# Patient Record
Sex: FEMALE | Race: WHITE | Employment: OTHER
[De-identification: names, ages, dates, MRNs, and addresses within clinical notes are randomized per-mention and may not be internally consistent; named-entity substitution may affect disease eponyms.]

---

## 2024-09-24 ENCOUNTER — TELEPHONE (OUTPATIENT)
Facility: HOSPITAL | Age: 79
End: 2024-09-24

## 2024-09-25 ENCOUNTER — HOSPITAL ENCOUNTER (OUTPATIENT)
Facility: HOSPITAL | Age: 79
Setting detail: RECURRING SERIES
Discharge: HOME | End: 2024-09-28
Payer: MEDICARE

## 2024-09-25 PROCEDURE — 97161 PT EVAL LOW COMPLEX 20 MIN: CPT

## 2024-09-25 PROCEDURE — 97535 SELF CARE MNGMENT TRAINING: CPT

## 2024-09-25 PROCEDURE — 97110 THERAPEUTIC EXERCISES: CPT

## 2024-10-01 ENCOUNTER — HOSPITAL ENCOUNTER (OUTPATIENT)
Facility: HOSPITAL | Age: 79
Setting detail: RECURRING SERIES
Discharge: HOME | End: 2024-10-04
Payer: MEDICARE

## 2024-10-01 PROCEDURE — 97110 THERAPEUTIC EXERCISES: CPT

## 2024-10-01 PROCEDURE — 97530 THERAPEUTIC ACTIVITIES: CPT

## 2024-10-01 PROCEDURE — 97112 NEUROMUSCULAR REEDUCATION: CPT

## 2024-10-01 NOTE — PROGRESS NOTES
PHYSICAL / OCCUPATIONAL THERAPY - DAILY TREATMENT NOTE     Patient Name: Sondra Sanchez    Date: 10/1/2024    : 1945  Insurance: Payor: MEDICARE / Plan: MEDICARE PART A AND B / Product Type: *No Product type* /      Patient  verified Yes     Visit #   Current / Total 2 16   Time   In / Out 11:30 12:10   Pain   In / Out 4 4-5   Subjective Functional Status/Changes: The pain woke me up three times last night   Changes to:  Allergies, Med Hx, Sx Hx?   no       TREATMENT AREA =  Pain in left knee [M25.562]    If an interpreting service is utilized for treatment of this patient, the contents of this document represent the material reviewed with the patient via the .     OBJECTIVE    Therapeutic Procedures:  Tx Min Billable or 1:1 Min (if diff from Tx Min) Procedure, Rationale, Specifics   18  37841 Therapeutic Exercise (timed):  increase ROM, strength, coordination, balance, and proprioception to improve patient's ability to progress to PLOF and address remaining functional goals. (see flow sheet as applicable)    Details if applicable:       10  85962 Neuromuscular Re-Education (timed):  improve balance, coordination, kinesthetic sense, posture, core stability and proprioception to improve patient's ability to develop conscious control of individual muscles and awareness of position of extremities in order to progress to PLOF and address remaining functional goals. (see flow sheet as applicable)    Details if applicable:     12  16789 Therapeutic Activity (timed):  use of dynamic activities replicating functional movements to increase ROM, strength, coordination, balance, and proprioception in order to improve patient's ability to progress to PLOF and address remaining functional goals.  (see flow sheet as applicable)     Details if applicable:           Details if applicable:            Details if applicable:     40  [facility] Totals Reminder: bill using total billable min of TIMED therapeutic

## 2024-10-03 ENCOUNTER — HOSPITAL ENCOUNTER (OUTPATIENT)
Facility: HOSPITAL | Age: 79
Setting detail: RECURRING SERIES
Discharge: HOME | End: 2024-10-06
Payer: MEDICARE

## 2024-10-03 PROCEDURE — 97530 THERAPEUTIC ACTIVITIES: CPT

## 2024-10-03 PROCEDURE — 97112 NEUROMUSCULAR REEDUCATION: CPT

## 2024-10-03 PROCEDURE — 97110 THERAPEUTIC EXERCISES: CPT

## 2024-10-03 NOTE — PROGRESS NOTES
PHYSICAL / OCCUPATIONAL THERAPY - DAILY TREATMENT NOTE     Patient Name: Sondra Sanchez    Date: 10/3/2024    : 1945  Insurance: Payor: MEDICARE / Plan: MEDICARE PART A AND B / Product Type: *No Product type* /      Patient  verified Yes     Visit #   Current / Total 3 16   Time   In / Out 11:30 12:10   Pain   In / Out 4 4   Subjective Functional Status/Changes: I walked around the grocery store for the first time in a month   Changes to:  Allergies, Med Hx, Sx Hx?   no       TREATMENT AREA =  Pain in left knee [M25.562]  Pain in left hip [M25.552]  Other low back pain [M54.59]    If an interpreting service is utilized for treatment of this patient, the contents of this document represent the material reviewed with the patient via the .     OBJECTIVE      Therapeutic Procedures:  Tx Min Billable or 1:1 Min (if diff from Tx Min) Procedure, Rationale, Specifics   17  90026 Therapeutic Exercise (timed):  increase ROM, strength, coordination, balance, and proprioception to improve patient's ability to progress to PLOF and address remaining functional goals. (see flow sheet as applicable)    Details if applicable:       11  34155 Neuromuscular Re-Education (timed):  improve balance, coordination, kinesthetic sense, posture, core stability and proprioception to improve patient's ability to develop conscious control of individual muscles and awareness of position of extremities in order to progress to PLOF and address remaining functional goals. (see flow sheet as applicable)    Details if applicable:     12  90983 Therapeutic Activity (timed):  use of dynamic activities replicating functional movements to increase ROM, strength, coordination, balance, and proprioception in order to improve patient's ability to progress to PLOF and address remaining functional goals.  (see flow sheet as applicable)     Details if applicable:           Details if applicable:            Details if applicable:     40  [facility]

## 2024-10-08 ENCOUNTER — HOSPITAL ENCOUNTER (OUTPATIENT)
Facility: HOSPITAL | Age: 79
Setting detail: RECURRING SERIES
Discharge: HOME | End: 2024-10-11
Payer: MEDICARE

## 2024-10-08 PROCEDURE — 97112 NEUROMUSCULAR REEDUCATION: CPT

## 2024-10-08 PROCEDURE — 97530 THERAPEUTIC ACTIVITIES: CPT

## 2024-10-08 PROCEDURE — 97110 THERAPEUTIC EXERCISES: CPT

## 2024-10-08 NOTE — PROGRESS NOTES
bill using total billable min of TIMED therapeutic procedures (example: do not include dry needle or estim unattended, both untimed codes, in totals to left)  8-22 min = 1 unit; 23-37 min = 2 units; 38-52 min = 3 units; 53-67 min = 4 units; 68-82 min = 5 units   Total Total     TOTAL TREATMENT TIME:        42     [x]  Patient Education billed concurrently with other procedures   [x] Review HEP    [] Progressed/Changed HEP, detail:    [] Other detail:       Objective Information/Functional Measures/Assessment    Pt presents to clinic with increased pain in left LE that radiates up to lumbar spine, but able to participate in all aspects of treatment today. Session focused on improving LE stability to reduce pain with prolonged sitting and riding her bike with her . Increased resistance with LAQ's/SAQ's to address quad strength deficits in left knee. Added lumbar mobility stretches to HEP to reduce pain radiating up to lumbar spine. Pt will benefit from skilled PT services to improve pain-free functional independence.    Patient will continue to benefit from skilled PT / OT services to modify and progress therapeutic interventions, analyze and address functional mobility deficits, analyze and address ROM deficits, analyze and address strength deficits, analyze and address soft tissue restrictions, analyze and cue for proper movement patterns, analyze and address imbalance/dizziness, and instruct in home and community integration to address functional deficits and attain remaining goals.    Progress toward goals / Updated goals:  []  See Progress Note/Recertification    Short Term Goals: To be accomplished in 8 treatments  Patient will be independent and compliant with HEP to progress toward goals and restore functional mobility.   [facility] Status: issued at [facility]  Current: Pt reports good initial HEP compliance 10/1/24     Patient will be able to sit for 1 hour without difficulty or reports of pain higher than

## 2024-10-10 ENCOUNTER — HOSPITAL ENCOUNTER (OUTPATIENT)
Facility: HOSPITAL | Age: 79
Setting detail: RECURRING SERIES
Discharge: HOME | End: 2024-10-13
Payer: MEDICARE

## 2024-10-10 PROCEDURE — 97110 THERAPEUTIC EXERCISES: CPT

## 2024-10-10 PROCEDURE — 97112 NEUROMUSCULAR REEDUCATION: CPT

## 2024-10-10 PROCEDURE — 97530 THERAPEUTIC ACTIVITIES: CPT

## 2024-10-10 NOTE — PROGRESS NOTES
PHYSICAL / OCCUPATIONAL THERAPY - DAILY TREATMENT NOTE     Patient Name: Sondra Sanchez    Date: 10/10/2024    : 1945  Insurance: Payor: MEDICARE / Plan: MEDICARE PART A AND B / Product Type: *No Product type* /      Patient  verified Yes     Visit #   Current / Total 5 16   Time   In / Out 10:47 11:29   Pain   In / Out 5 5-6   Subjective Functional Status/Changes: Pt reports no new complaints    Changes to:  Allergies, Med Hx, Sx Hx?   no       TREATMENT AREA =  Pain in left knee [M25.562]  Pain in left hip [M25.552]  Other low back pain [M54.59]    If an interpreting service is utilized for treatment of this patient, the contents of this document represent the material reviewed with the patient via the .     OBJECTIVE    Therapeutic Procedures:  Tx Min Billable or 1:1 Min (if diff from Tx Min) Procedure, Rationale, Specifics   21  75704 Therapeutic Exercise (timed):  increase ROM, strength, coordination, balance, and proprioception to improve patient's ability to progress to PLOF and address remaining functional goals. (see flow sheet as applicable)    Details if applicable:       10  32651 Neuromuscular Re-Education (timed):  improve balance, coordination, kinesthetic sense, posture, core stability and proprioception to improve patient's ability to develop conscious control of individual muscles and awareness of position of extremities in order to progress to PLOF and address remaining functional goals. (see flow sheet as applicable)    Details if applicable:     11  68345 Therapeutic Activity (timed):  use of dynamic activities replicating functional movements to increase ROM, strength, coordination, balance, and proprioception in order to improve patient's ability to progress to PLOF and address remaining functional goals.  (see flow sheet as applicable)     Details if applicable:           Details if applicable:            Details if applicable:     42  [facility] Totals Reminder: bill using

## 2024-10-15 ENCOUNTER — HOSPITAL ENCOUNTER (OUTPATIENT)
Facility: HOSPITAL | Age: 79
Setting detail: RECURRING SERIES
Discharge: HOME | End: 2024-10-18
Payer: MEDICARE

## 2024-10-15 PROCEDURE — 97112 NEUROMUSCULAR REEDUCATION: CPT

## 2024-10-15 PROCEDURE — 97530 THERAPEUTIC ACTIVITIES: CPT

## 2024-10-15 PROCEDURE — 97110 THERAPEUTIC EXERCISES: CPT

## 2024-10-15 NOTE — PROGRESS NOTES
[facility] Totals Reminder: bill using total billable min of TIMED therapeutic procedures (example: do not include dry needle or estim unattended, both untimed codes, in totals to left)  8-22 min = 1 unit; 23-37 min = 2 units; 38-52 min = 3 units; 53-67 min = 4 units; 68-82 min = 5 units   Total Total     TOTAL TREATMENT TIME:        40     [x]  Patient Education billed concurrently with other procedures   [x] Review HEP    [] Progressed/Changed HEP, detail:    [] Other detail:       Objective Information/Functional Measures/Assessment    Pt scored 30 on LEFS today showing positive improvements in subjective function. Added tandem walking with SBA from therapist and double leg press to continue progressing LE stability/functional strength. Pt only experienced one brief LOB to the left but able to recover her balance on her own. Verbal/tactile cues required with double leg press to ensure proper technique. Rest breaks as needed due to fatigue from new interventions. Will continue to progress exercises as tolerated     Patient will continue to benefit from skilled PT / OT services to modify and progress therapeutic interventions, analyze and address functional mobility deficits, analyze and address ROM deficits, analyze and address strength deficits, analyze and address soft tissue restrictions, analyze and cue for proper movement patterns, analyze and address imbalance/dizziness, and instruct in home and community integration to address functional deficits and attain remaining goals.    Progress toward goals / Updated goals:  []  See Progress Note/Recertification    Short Term Goals: To be accomplished in 8 treatments  Patient will be independent and compliant with HEP to progress toward goals and restore functional mobility.   Eval Status: issued at eval  Current: Pt reports good initial HEP compliance 10/1/24     Patient will be able to sit for 1 hour without difficulty or reports of pain higher than 1-2/10  Eval Status:

## 2024-10-17 ENCOUNTER — HOSPITAL ENCOUNTER (OUTPATIENT)
Facility: HOSPITAL | Age: 79
Setting detail: RECURRING SERIES
Discharge: HOME | End: 2024-10-20
Payer: MEDICARE

## 2024-10-17 PROCEDURE — 97112 NEUROMUSCULAR REEDUCATION: CPT

## 2024-10-17 PROCEDURE — 97110 THERAPEUTIC EXERCISES: CPT

## 2024-10-17 PROCEDURE — 97530 THERAPEUTIC ACTIVITIES: CPT

## 2024-10-17 NOTE — PROGRESS NOTES
PHYSICAL / OCCUPATIONAL THERAPY - DAILY TREATMENT NOTE (updated )    Patient Name: Sondra Sanchez    Date: 10/17/2024    : 1945  Insurance: Payor: MEDICARE / Plan: MEDICARE PART A AND B / Product Type: *No Product type* /      Patient  verified Yes     Visit #   Current / Total 7 16   Time   In / Out 1050 1039   Pain   In / Out 4 4   Subjective Functional Status/Changes: \"The low back is alright but the hip and knee have been giving me some pain.\"   Changes to:  Meds, Allergies, Med Hx, Sx Hx?  If yes, update Summary List no       TREATMENT AREA =  Pain in left knee [M25.562]  Pain in left hip [M25.552]  Other low back pain [M54.59]    If an interpreting service is utilized for treatment of this patient, the contents of this document represent the material reviewed with the patient via the .     OBJECTIVE    Therapeutic Procedures:  Tx Min Billable or 1:1 Min (if diff from Tx Min) Procedure, Rationale, Specifics   19  76603 Therapeutic Exercise (timed):  increase ROM, strength, coordination, balance, and proprioception to improve patient's ability to progress to PLOF and address remaining functional goals. (see flow sheet as applicable)     Details if applicable:       15  68814 Therapeutic Activity (timed):  use of dynamic activities replicating functional movements to increase ROM, strength, coordination, balance, and proprioception in order to improve patient's ability to progress to PLOF and address remaining functional goals.  (see flow sheet as applicable)     Details if applicable:     15  06302 Neuromuscular Re-Education (timed):  improve balance, coordination, kinesthetic sense, posture, core stability and proprioception to improve patient's ability to develop conscious control of individual muscles and awareness of position of extremities in order to progress to PLOF and address remaining functional goals. (see flow sheet as applicable)     Details if applicable:     49  [facility]

## 2024-10-22 ENCOUNTER — HOSPITAL ENCOUNTER (OUTPATIENT)
Facility: HOSPITAL | Age: 79
Setting detail: RECURRING SERIES
Discharge: HOME | End: 2024-10-25
Payer: MEDICARE

## 2024-10-22 PROCEDURE — 97530 THERAPEUTIC ACTIVITIES: CPT

## 2024-10-22 PROCEDURE — 97112 NEUROMUSCULAR REEDUCATION: CPT

## 2024-10-22 PROCEDURE — 97110 THERAPEUTIC EXERCISES: CPT

## 2024-10-22 NOTE — PROGRESS NOTES
Totals Reminder: bill using total billable min of TIMED therapeutic procedures (example: do not include dry needle or estim unattended, both untimed codes, in totals to left)  8-22 min = 1 unit; 23-37 min = 2 units; 38-52 min = 3 units; 53-67 min = 4 units; 68-82 min = 5 units   Total Total       TOTAL TREATMENT TIME:        54       [x]  Patient Education billed concurrently with other procedures   [x] Review HEP    [] Progressed/Changed HEP, detail:    [] Other detail:       Objective Information/Functional Measures/Assessment    Patient reporting progressive decrease in pain intensity as exercise intensity is carefully advanced. Patient somewhat apprehensive to advance exercises but is agreeable after education on importance of advancing exercises. Patient provided written copy of updates to HEP and resistance band for home use.    Patient will continue to benefit from skilled PT / OT services to modify and progress therapeutic interventions, analyze and address functional mobility deficits, analyze and address ROM deficits, analyze and address strength deficits, analyze and address soft tissue restrictions, analyze and cue for proper movement patterns, analyze and modify for postural abnormalities, analyze and address imbalance/dizziness, and instruct in home and community integration to address functional deficits and attain remaining goals.    Progress toward goals / Updated goals:  []  See Progress Note/Recertification    Short Term Goals: To be accomplished in 8 treatments  Patient will be independent and compliant with HEP to progress toward goals and restore functional mobility.   Eval Status: issued at eval  Current: Patient provided written copy of updates to HEP and resistance band for home use. 10/22/24     Patient will be able to sit for 1 hour without difficulty or reports of pain higher than 1-2/10  Eval Status: Unable to sit for one hour without pain   Current: Pt reports ability to sit ~ 30 mins

## 2024-10-24 ENCOUNTER — HOSPITAL ENCOUNTER (OUTPATIENT)
Facility: HOSPITAL | Age: 79
Setting detail: RECURRING SERIES
Discharge: HOME | End: 2024-10-27
Payer: MEDICARE

## 2024-10-24 PROCEDURE — 97016 VASOPNEUMATIC DEVICE THERAPY: CPT

## 2024-10-24 PROCEDURE — 97110 THERAPEUTIC EXERCISES: CPT

## 2024-10-24 PROCEDURE — 97112 NEUROMUSCULAR REEDUCATION: CPT

## 2024-10-24 PROCEDURE — 97530 THERAPEUTIC ACTIVITIES: CPT

## 2024-10-24 NOTE — PROGRESS NOTES
[facility]  [address] [address]                                                                                      Phone: [phone]   Fax: [phone]    Progress Note  Patient name: Sondra Sanchez Start of Care: 2024   Referral source: Kristen Virgen : 1945               Medical Diagnosis: Pain in left knee [M25.562]      Onset Date:24               Treatment Diagnosis:  M25.562  LEFT KNEE PAIN        M25.552  LEFT HIP PAIN                        BACK PAIN                        Prior Hospitalization: see medical history Provider#: [id number]   Medications: Verified on Patient Summary List      Comorbidities: Other: HTN, HLD, Osteopenia     Prior Level of Function: functionally independent, no AD, active lifestyle, gardening, home maintenance, bike riding     Visits from Start of Care: 9    Missed Visits: 0    Updated Goals/Measure of Progress: To be achieved in 4 weeks:    Short Term Goals: To be accomplished in 8 treatments  Patient will be independent and compliant with HEP to progress toward goals and restore functional mobility.   Eval Status: issued at [facility]  Current: Patient provided written copy of updates to HEP and resistance band for home use. 10/22/24     Patient will be able to sit for 1 hour without difficulty or reports of pain higher than 1-2/10  Eval Status: Unable to sit for one hour without pain   Current: Pt reports ability to sit ~ 30 mins void of pain 10/10/24     Long Term Goals: To be accomplished in 16 treatments:  Patient will improve LEFS score by 9 points to improve functional tolerance for ADLs and IADLs.  Eval Status: LEFS: 18/80  Current: 30 10/15/24     Pt will have be have 5/5 Hip abduction strength to improve single-limb stance and gait patterns  Eval Status: Right hip 4+/5 left hip: 4/5  Current: Right hip 4+/5 left hip: 4/5    10/22/24     Pt will have be able to ride bike for 10 mins with  pain-free

## 2024-10-24 NOTE — PROGRESS NOTES
PHYSICAL / OCCUPATIONAL THERAPY - DAILY TREATMENT NOTE (updated )    Patient Name: Sondra Sanchez    Date: 10/24/2024    : 1945  Insurance: Payor: MEDICARE / Plan: MEDICARE PART A AND B / Product Type: *No Product type* /      Patient  verified Yes     Visit #   Current / Total 9 16   Time   In / Out 1125 1221   Pain   In / Out 5 3-4   Subjective Functional Status/Changes: \"I did my semi-annual heavy cleaning of my entire home yesterday, turning a queen mattress by myself, and it aggravated my knee and back quite a bit.\"   Changes to:  Meds, Allergies, Med Hx, Sx Hx?  If yes, update Summary List no       TREATMENT AREA =  Pain in left knee [M25.562]  Pain in left hip [M25.552]  Other low back pain [M54.59]    If an interpreting service is utilized for treatment of this patient, the contents of this document represent the material reviewed with the patient via the .     OBJECTIVE  Modalities Rationale:     decrease edema and decrease pain to improve patient's ability to Complete ADLS  10 min [x]  Vasopneumatic Device, press/temp: Medium, low      If using vaso (need to measure limb vaso being performed on)      pre-treatment girth : 38.2 cm.      post-treatment girth : 37.7 cm.      measured at (landmark location): mid-patella       min []  Other:    [x] Skin assessment post-treatment (if applicable):    [x]  intact    []  redness- no adverse reaction     []redness - adverse reaction:        Therapeutic Procedures:  Tx Min Billable or 1:1 Min (if diff from Tx Min) Procedure, Rationale, Specifics   20 16 78124 Therapeutic Exercise (timed):  increase ROM, strength, coordination, balance, and proprioception to improve patient's ability to progress to PLOF and address remaining functional goals. (see flow sheet as applicable)     Details if applicable:       19 24456 Therapeutic Activity (timed):  use of dynamic activities replicating functional movements to increase ROM, strength,

## 2024-10-29 ENCOUNTER — HOSPITAL ENCOUNTER (OUTPATIENT)
Facility: HOSPITAL | Age: 79
Setting detail: RECURRING SERIES
Discharge: HOME | End: 2024-11-01
Payer: MEDICARE

## 2024-10-29 PROCEDURE — 97110 THERAPEUTIC EXERCISES: CPT

## 2024-10-29 PROCEDURE — 97016 VASOPNEUMATIC DEVICE THERAPY: CPT

## 2024-10-29 PROCEDURE — 97530 THERAPEUTIC ACTIVITIES: CPT

## 2024-10-29 PROCEDURE — 97112 NEUROMUSCULAR REEDUCATION: CPT

## 2024-10-29 NOTE — PROGRESS NOTES
participate in treatment today. Session focused on improving weight bearing tolerance in left LE to improve pain-free functional independence. Modified rehab program today as tolerated to continue progressing towards remaining goals in POC. Pt required minimal verbal cues to reduce compensatory strategies with standing interventions today due to increased pain. Ended session with vaso to left knee and Pt reports little to no changes in pain post treatment. Will advance exercises as tolerated next visit.      Patient will continue to benefit from skilled PT / OT services to modify and progress therapeutic interventions, analyze and address functional mobility deficits, analyze and address ROM deficits, analyze and address strength deficits, analyze and address soft tissue restrictions, analyze and cue for proper movement patterns, analyze and modify for postural abnormalities, analyze and address imbalance/dizziness, and instruct in home and community integration to address functional deficits and attain remaining goals.    Progress toward goals / Updated goals:  []  See Progress Note/Recertification    Short Term Goals: To be accomplished in 8 treatments  Patient will be independent and compliant with HEP to progress toward goals and restore functional mobility.   Eval Status: issued at eval  Current: Patient provided written copy of updates to HEP and resistance band for home use. 10/22/24     Patient will be able to sit for 1 hour without difficulty or reports of pain higher than 1-2/10  Eval Status: Unable to sit for one hour without pain   Current: Pt reports ability to sit for an hour without difficulty 10/29/24     Long Term Goals: To be accomplished in 16 treatments:  Patient will improve LEFS score by 9 points to improve functional tolerance for ADLs and IADLs.  Eval Status: LEFS: 18/80  Current: 30 10/15/24     Pt will have be have 5/5 Hip abduction strength to improve single-limb stance and gait

## 2024-10-31 ENCOUNTER — HOSPITAL ENCOUNTER (OUTPATIENT)
Facility: HOSPITAL | Age: 79
Setting detail: RECURRING SERIES
Discharge: HOME | End: 2024-11-03
Payer: MEDICARE

## 2024-10-31 PROCEDURE — 97530 THERAPEUTIC ACTIVITIES: CPT

## 2024-10-31 PROCEDURE — 97112 NEUROMUSCULAR REEDUCATION: CPT

## 2024-10-31 PROCEDURE — 97110 THERAPEUTIC EXERCISES: CPT

## 2024-10-31 NOTE — PROGRESS NOTES
PHYSICAL / OCCUPATIONAL THERAPY - DAILY TREATMENT NOTE (updated )    Patient Name: Sondra Sanchez    Date: 10/31/2024    : 1945  Insurance: Payor: MEDICARE / Plan: MEDICARE PART A AND B / Product Type: *No Product type* /      Patient  verified Yes     Visit #   Current / Total 11 16   Time   In / Out 1053 1136   Pain   In / Out 3 2-3   Subjective Functional Status/Changes: \"The pain has been a bit lower past couple days. Although I am still going to have an evaluation by a surgeon to determine if I need a knee replacement.\"   Changes to:  Meds, Allergies, Med Hx, Sx Hx?  If yes, update Summary List no       TREATMENT AREA =  Pain in left knee [M25.562]  Pain in left hip [M25.552]  Other low back pain [M54.59]    If an interpreting service is utilized for treatment of this patient, the contents of this document represent the material reviewed with the patient via the .     OBJECTIVE    Therapeutic Procedures:  Tx Min Billable or 1:1 Min (if diff from Tx Min) Procedure, Rationale, Specifics   18  93938 Therapeutic Exercise (timed):  increase ROM, strength, coordination, balance, and proprioception to improve patient's ability to progress to PLOF and address remaining functional goals. (see flow sheet as applicable)     Details if applicable:       15  35656 Therapeutic Activity (timed):  use of dynamic activities replicating functional movements to increase ROM, strength, coordination, balance, and proprioception in order to improve patient's ability to progress to PLOF and address remaining functional goals.  (see flow sheet as applicable)     Details if applicable:     10  05866 Neuromuscular Re-Education (timed):  improve balance, coordination, kinesthetic sense, posture, core stability and proprioception to improve patient's ability to develop conscious control of individual muscles and awareness of position of extremities in order to progress to PLOF and address remaining functional

## 2024-11-05 ENCOUNTER — APPOINTMENT (OUTPATIENT)
Facility: HOSPITAL | Age: 79
End: 2024-11-05
Payer: MEDICARE

## 2024-11-05 ENCOUNTER — HOSPITAL ENCOUNTER (OUTPATIENT)
Facility: HOSPITAL | Age: 79
Setting detail: RECURRING SERIES
Discharge: HOME | End: 2024-11-08
Payer: MEDICARE

## 2024-11-05 PROCEDURE — 97530 THERAPEUTIC ACTIVITIES: CPT

## 2024-11-05 PROCEDURE — 97112 NEUROMUSCULAR REEDUCATION: CPT

## 2024-11-05 PROCEDURE — 97110 THERAPEUTIC EXERCISES: CPT

## 2024-11-05 NOTE — PROGRESS NOTES
Reminder: bill using total billable min of TIMED therapeutic procedures (example: do not include dry needle or estim unattended, both untimed codes, in totals to left)  8-22 min = 1 unit; 23-37 min = 2 units; 38-52 min = 3 units; 53-67 min = 4 units; 68-82 min = 5 units   Total Total     TOTAL TREATMENT TIME:        40     [x]  Patient Education billed concurrently with other procedures   [x] Review HEP    [] Progressed/Changed HEP, detail:    [] Other detail:       Objective Information/Functional Measures/Assessment    Pt reports daily compliance with HEP to address remaining deficits in POC. Pt tolerated session well today with no complications. Session continued focus on improving activity tolerance to restore prior level of function. Pt able to perform all interventions without requiring cues for proper form/posture and no reports of increased pain/discomfort in left LE. Consider incorporating more dynamic standing interventions next visit to challenge stability in left LE.     Patient will continue to benefit from skilled PT / OT services to modify and progress therapeutic interventions, analyze and address functional mobility deficits, analyze and address ROM deficits, analyze and address strength deficits, analyze and address soft tissue restrictions, analyze and cue for proper movement patterns, analyze and modify for postural abnormalities, analyze and address imbalance/dizziness, and instruct in home and community integration to address functional deficits and attain remaining goals.    Progress toward goals / Updated goals:  []  See Progress Note/Recertification    Short Term Goals: To be accomplished in 8 treatments  Patient will be independent and compliant with HEP to progress toward goals and restore functional mobility.   Eval Status: issued at eval  Current: Pt reports daily HEP compliance MET 11/5/24     Patient will be able to sit for 1 hour without difficulty or reports of pain higher than

## 2024-11-07 ENCOUNTER — APPOINTMENT (OUTPATIENT)
Facility: HOSPITAL | Age: 79
End: 2024-11-07
Payer: MEDICARE

## 2024-11-07 ENCOUNTER — HOSPITAL ENCOUNTER (OUTPATIENT)
Facility: HOSPITAL | Age: 79
Setting detail: RECURRING SERIES
Discharge: HOME | End: 2024-11-10
Payer: MEDICARE

## 2024-11-07 PROCEDURE — 97530 THERAPEUTIC ACTIVITIES: CPT

## 2024-11-07 PROCEDURE — 97110 THERAPEUTIC EXERCISES: CPT

## 2024-11-07 PROCEDURE — 97112 NEUROMUSCULAR REEDUCATION: CPT

## 2024-11-07 NOTE — PROGRESS NOTES
sheet as applicable)     Details if applicable:     42  [facility] Totals Reminder: bill using total billable min of TIMED therapeutic procedures (example: do not include dry needle or estim unattended, both untimed codes, in totals to left)  8-22 min = 1 unit; 23-37 min = 2 units; 38-52 min = 3 units; 53-67 min = 4 units; 68-82 min = 5 units   Total Total       TOTAL TREATMENT TIME:        42       [x]  Patient Education billed concurrently with other procedures   [x] Review HEP    [] Progressed/Changed HEP, detail:    [] Other detail:       Objective Information/Functional Measures/Assessment    Patient reporting left ITB discomfort after long term sitting causing difficulty rising from sitting to standing. Instructed patient in seated ITB stretch to utilize after long term sitting to avoid discomfort. Patient provided written copy of new exercise to add to HEP.    Patient will continue to benefit from skilled PT / OT services to modify and progress therapeutic interventions, analyze and address functional mobility deficits, analyze and address ROM deficits, analyze and address strength deficits, analyze and address soft tissue restrictions, analyze and cue for proper movement patterns, analyze and modify for postural abnormalities, analyze and address imbalance/dizziness, and instruct in home and community integration to address functional deficits and attain remaining goals.    Progress toward goals / Updated goals:  []  See Progress Note/Recertification    Short Term Goals: To be accomplished in 8 treatments  Patient will be independent and compliant with HEP to progress toward goals and restore functional mobility.   Eval Status: issued at eval  Current: Pt provided written copy of seated ITB stretch to add to HEP   11/7/24     Patient will be able to sit for 1 hour without difficulty or reports of pain higher than 1-2/10  Eval Status: Unable to sit for one hour without pain   Current: Pt reports ability to sit for

## 2024-11-12 ENCOUNTER — APPOINTMENT (OUTPATIENT)
Facility: HOSPITAL | Age: 79
End: 2024-11-12
Payer: MEDICARE

## 2024-11-12 ENCOUNTER — HOSPITAL ENCOUNTER (OUTPATIENT)
Facility: HOSPITAL | Age: 79
Setting detail: RECURRING SERIES
Discharge: HOME | End: 2024-11-15
Payer: MEDICARE

## 2024-11-12 PROCEDURE — 97530 THERAPEUTIC ACTIVITIES: CPT

## 2024-11-12 PROCEDURE — 97112 NEUROMUSCULAR REEDUCATION: CPT

## 2024-11-12 PROCEDURE — 97110 THERAPEUTIC EXERCISES: CPT

## 2024-11-12 NOTE — PROGRESS NOTES
Details if applicable:     41  [facility] Totals Reminder: bill using total billable min of TIMED therapeutic procedures (example: do not include dry needle or estim unattended, both untimed codes, in totals to left)  8-22 min = 1 unit; 23-37 min = 2 units; 38-52 min = 3 units; 53-67 min = 4 units; 68-82 min = 5 units   Total Total     TOTAL TREATMENT TIME:        41     [x]  Patient Education billed concurrently with other procedures   [x] Review HEP    [] Progressed/Changed HEP, detail:    [] Other detail:       Objective Information/Functional Measures/Assessment     Pt tolerated session well today with no reports of increased pain/discomfort in left LE. Session continued focus on improving functional strength in bilateral LE's to decrease pain when navigating stairs and sitting for prolonged periods of time. Increased resistance with single leg press to improve single leg stability to decrease difficulty navigating stairs. Pt still able to demo proper exercise mechanics with increased resistance and only reports mild fatigue/soreness post treatment. Will advance exercises as tolerated to restore prior level of function.     Patient will continue to benefit from skilled PT / OT services to modify and progress therapeutic interventions, analyze and address functional mobility deficits, analyze and address ROM deficits, analyze and address strength deficits, analyze and address soft tissue restrictions, analyze and cue for proper movement patterns, analyze and modify for postural abnormalities, analyze and address imbalance/dizziness, and instruct in home and community integration to address functional deficits and attain remaining goals.    Progress toward goals / Updated goals:  []  See Progress Note/Recertification    Short Term Goals: To be accomplished in 8 treatments  Patient will be independent and compliant with HEP to progress toward goals and restore functional mobility.   Eval Status: issued at

## 2024-11-14 ENCOUNTER — HOSPITAL ENCOUNTER (OUTPATIENT)
Facility: HOSPITAL | Age: 79
Setting detail: RECURRING SERIES
Discharge: HOME | End: 2024-11-17
Payer: MEDICARE

## 2024-11-14 PROCEDURE — 97530 THERAPEUTIC ACTIVITIES: CPT

## 2024-11-14 PROCEDURE — 97110 THERAPEUTIC EXERCISES: CPT

## 2024-11-14 PROCEDURE — 97112 NEUROMUSCULAR REEDUCATION: CPT

## 2024-11-14 NOTE — PROGRESS NOTES
PHYSICAL / OCCUPATIONAL THERAPY - DAILY TREATMENT NOTE     Patient Name: Sondra Sanchez    Date: 2024    : 1945  Insurance: Payor: MEDICARE / Plan: MEDICARE PART A AND B / Product Type: *No Product type* /      Patient  verified Yes     Visit #   Current / Total 15 16   Time   In / Out 10:50 11:30   Pain   In / Out 0 0   Subjective Functional Status/Changes: I think I'm about ready to graduate from PT   Changes to:  Allergies, Med Hx, Sx Hx?   no       TREATMENT AREA =  Pain in left knee [M25.562]  Pain in left hip [M25.552]  Other low back pain [M54.59]    If an interpreting service is utilized for treatment of this patient, the contents of this document represent the material reviewed with the patient via the .     OBJECTIVE    Therapeutic Procedures:  Tx Min Billable or 1:1 Min (if diff from Tx Min) Procedure, Rationale, Specifics   17  05200 Therapeutic Exercise (timed):  increase ROM, strength, coordination, balance, and proprioception to improve patient's ability to progress to PLOF and address remaining functional goals. (see flow sheet as applicable)    Details if applicable:       11  17381 Neuromuscular Re-Education (timed):  improve balance, coordination, kinesthetic sense, posture, core stability and proprioception to improve patient's ability to develop conscious control of individual muscles and awareness of position of extremities in order to progress to PLOF and address remaining functional goals. (see flow sheet as applicable)    Details if applicable:     12  31434 Therapeutic Activity (timed):  use of dynamic activities replicating functional movements to increase ROM, strength, coordination, balance, and proprioception in order to improve patient's ability to progress to PLOF and address remaining functional goals.  (see flow sheet as applicable)     Details if applicable:           Details if applicable:            Details if applicable:     40  [facility] Totals Reminder:

## 2024-11-19 ENCOUNTER — APPOINTMENT (OUTPATIENT)
Facility: HOSPITAL | Age: 79
End: 2024-11-19
Payer: MEDICARE

## 2024-11-19 ENCOUNTER — HOSPITAL ENCOUNTER (OUTPATIENT)
Facility: HOSPITAL | Age: 79
Setting detail: RECURRING SERIES
Discharge: HOME | End: 2024-11-22
Payer: MEDICARE

## 2024-11-19 PROCEDURE — 97112 NEUROMUSCULAR REEDUCATION: CPT

## 2024-11-19 PROCEDURE — 97110 THERAPEUTIC EXERCISES: CPT

## 2024-11-19 PROCEDURE — 97530 THERAPEUTIC ACTIVITIES: CPT

## 2024-11-19 NOTE — PROGRESS NOTES
PHYSICAL / OCCUPATIONAL THERAPY - DAILY TREATMENT NOTE (updated )    Patient Name: Sondra Sanchez    Date: 2024    : 1945  Insurance: Payor: MEDICARE / Plan: MEDICARE PART A AND B / Product Type: *No Product type* /      Patient  verified Yes     Visit #   Current / Total 16 16   Time   In / Out 1050 1139   Pain   In / Out 0 0   Subjective Functional Status/Changes: \"I am feeling good and I feel confident with the exercises. I think Anand ready to stop PT and go on my own now.\"   Changes to:  Meds, Allergies, Med Hx, Sx Hx?  If yes, update Summary List no       TREATMENT AREA =  Pain in left knee [M25.562]  Pain in left hip [M25.552]  Other low back pain [M54.59]    If an interpreting service is utilized for treatment of this patient, the contents of this document represent the material reviewed with the patient via the .     OBJECTIVE    Therapeutic Procedures:  Tx Min Billable or 1:1 Min (if diff from Tx Min) Procedure, Rationale, Specifics   19  65168 Therapeutic Exercise (timed):  increase ROM, strength, coordination, balance, and proprioception to improve patient's ability to progress to PLOF and address remaining functional goals. (see flow sheet as applicable)     Details if applicable:       15  41534 Therapeutic Activity (timed):  use of dynamic activities replicating functional movements to increase ROM, strength, coordination, balance, and proprioception in order to improve patient's ability to progress to PLOF and address remaining functional goals.  (see flow sheet as applicable)     Details if applicable:     15  14852 Neuromuscular Re-Education (timed):  improve balance, coordination, kinesthetic sense, posture, core stability and proprioception to improve patient's ability to develop conscious control of individual muscles and awareness of position of extremities in order to progress to PLOF and address remaining functional goals. (see flow sheet as applicable)

## 2024-11-19 NOTE — PROGRESS NOTES
Physical Therapy Discharge Instructions    [facility]  [address] [address]  Phone: [phone]   Fax: [phone]      Patient: Sondra Sanchez  : 1945    Continue Home Exercise Program 2 times per day for 6 weeks, then decrease to 4 times per week      Continue with    [x] Ice  as needed      [] Heat           Follow up with MD:     [] Upon completion of therapy     [x] As needed      Recommendations:     [x]   Return to activity with home program    []   Return to activity with the following modifications:       []Post Rehab Program    []Join Independent aquatic program     []Return to/join local gym      Additional Comments: Please contact clinic at above phone number if you have any questions regarding Home Exercise Program or self care instructions.

## 2024-11-19 NOTE — PROGRESS NOTES
[facility]  [address] [address]  Phone: [phone]   Fax: [phone]    Discharge Summary    Patient name: Sondra Sanchez Start of Care: 2024   Referral source: Param Kristen PATRICIA : 1945               Medical Diagnosis: Pain in left knee [M25.562]      Onset Date:24               Treatment Diagnosis:  M25.562  LEFT KNEE PAIN        M25.552  LEFT HIP PAIN                        BACK PAIN                        Prior Hospitalization: see medical history Provider#: [id number]   Medications: Verified on Patient Summary List      Comorbidities: Other: HTN, HLD, Osteopenia     Prior Level of Function: functionally independent, no AD, active lifestyle, gardening, home maintenance, bike riding      Visits from Start of Care:16                                      Missed Visits: 0    Reporting Period : 24 to 24    Goals/Measure of Progress:  Short Term Goals: To be accomplished in 8 treatments  Patient will be independent and compliant with HEP to progress toward goals and restore functional mobility.   Eval Status: issued at [facility]  Current: Pt demonstrates independence and reports good compliance with full HEP   MET, 24     Patient will be able to sit for 1 hour without difficulty or reports of pain higher than 1-2/10  Eval Status: Unable to sit for one hour without pain   Current: Pt reports ability to sit for >1 hour without difficulty    MET, 24     Long Term Goals: To be accomplished in 16 treatments:  Patient will improve LEFS score by 9 points to improve functional tolerance for ADLs and IADLs.  Eval Status: LEFS: 1880  Current: 62    MET, 24     Pt will have be have 5/5 Hip abduction strength to improve single-limb stance and gait patterns  Eval Status: Right hip 4+/5 left hip: 4/5  Current: Right hip 5/5 left hip: 5/5    MET, 24     Pt will have be able to ride bike for 10 mins with  pain-free to progress

## 2024-11-21 ENCOUNTER — APPOINTMENT (OUTPATIENT)
Facility: HOSPITAL | Age: 79
End: 2024-11-21
Payer: MEDICARE

## 2024-11-26 ENCOUNTER — APPOINTMENT (OUTPATIENT)
Facility: HOSPITAL | Age: 79
End: 2024-11-26
Payer: MEDICARE